# Patient Record
Sex: FEMALE | Race: WHITE | NOT HISPANIC OR LATINO | Employment: OTHER | URBAN - METROPOLITAN AREA
[De-identification: names, ages, dates, MRNs, and addresses within clinical notes are randomized per-mention and may not be internally consistent; named-entity substitution may affect disease eponyms.]

---

## 2017-04-06 DIAGNOSIS — Z12.31 ENCOUNTER FOR SCREENING MAMMOGRAM FOR MALIGNANT NEOPLASM OF BREAST: ICD-10-CM

## 2017-06-23 ENCOUNTER — ALLSCRIPTS OFFICE VISIT (OUTPATIENT)
Dept: OTHER | Facility: OTHER | Age: 52
End: 2017-06-23

## 2017-06-23 DIAGNOSIS — Z12.31 ENCOUNTER FOR SCREENING MAMMOGRAM FOR MALIGNANT NEOPLASM OF BREAST: ICD-10-CM

## 2017-11-09 ENCOUNTER — TRANSCRIBE ORDERS (OUTPATIENT)
Dept: ADMINISTRATIVE | Facility: HOSPITAL | Age: 52
End: 2017-11-09

## 2017-11-09 DIAGNOSIS — Z12.39 SCREENING BREAST EXAMINATION: Primary | ICD-10-CM

## 2017-11-14 ENCOUNTER — HOSPITAL ENCOUNTER (OUTPATIENT)
Dept: MAMMOGRAPHY | Facility: CLINIC | Age: 52
Discharge: HOME | End: 2017-11-14
Payer: COMMERCIAL

## 2017-11-14 DIAGNOSIS — Z12.31 ENCOUNTER FOR SCREENING MAMMOGRAM FOR MALIGNANT NEOPLASM OF BREAST: ICD-10-CM

## 2017-11-14 DIAGNOSIS — Z12.39 SCREENING BREAST EXAMINATION: ICD-10-CM

## 2017-11-14 PROCEDURE — 77063 BREAST TOMOSYNTHESIS BI: CPT

## 2017-11-14 PROCEDURE — G0202 SCR MAMMO BI INCL CAD: HCPCS

## 2018-01-14 VITALS
DIASTOLIC BLOOD PRESSURE: 78 MMHG | HEIGHT: 64 IN | SYSTOLIC BLOOD PRESSURE: 110 MMHG | WEIGHT: 138.5 LBS | BODY MASS INDEX: 23.64 KG/M2

## 2018-01-16 NOTE — MISCELLANEOUS
Message   Recorded as Task   Date: 06/13/2016 08:05 AM, Created By: Krishna Bashir   Task Name: Go to Note   Assigned To: Timo Hays   Regarding Patient: Dian Corbett, Status: Active   Comment:    Timo Hays - 13 Jun 2016 8:05 AM     TASK CREATED  Patient should benefit by having a automated breast ultrasound due to breast density and risk factors   Julieta King - 13 Jun 2016 9:33 AM     TASK REPLIED TO: 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

## 2018-01-18 NOTE — MISCELLANEOUS
Message   Date: 25 Jul 2016 9:36 AM EST, Recorded By: Chico Reddy For: Juliana Felix   Caller: Filiberto Pineda, Self   Phone: [phone] (Home)   Reason: Other   pt was supposed to have a sonohyst today, but her insurance was denying payments    pt will settle the issue with her insurance and will call back to reschedule the sonohyst per [facility] orders           Active Problems    1  Dense breasts (793 82) (R92 2)   2  Encounter for routine gynecological examination (V72 31) (Z01 419)   3  Encounter for routine gynecological examination with Papanicolaou smear of cervix   (V72 31,V76 2) (Z01 419)   4  Encounter for screening for malignant neoplasm of cervix (V76 2) (Z12 4)   5  History of self breast exam   6  Menorrhagia with irregular cycle (626 2) (N92 1)   7  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

## 2018-07-19 ENCOUNTER — ANNUAL EXAM (OUTPATIENT)
Dept: OBGYN CLINIC | Facility: CLINIC | Age: 53
End: 2018-07-19
Payer: COMMERCIAL

## 2018-07-19 VITALS
HEIGHT: 64 IN | SYSTOLIC BLOOD PRESSURE: 124 MMHG | BODY MASS INDEX: 24.17 KG/M2 | DIASTOLIC BLOOD PRESSURE: 78 MMHG | WEIGHT: 141.6 LBS

## 2018-07-19 DIAGNOSIS — Z01.419 WOMEN'S ANNUAL ROUTINE GYNECOLOGICAL EXAMINATION: Primary | ICD-10-CM

## 2018-07-19 DIAGNOSIS — Z12.31 VISIT FOR SCREENING MAMMOGRAM: ICD-10-CM

## 2018-07-19 PROCEDURE — 99396 PREV VISIT EST AGE 40-64: CPT | Performed by: OBSTETRICS & GYNECOLOGY

## 2018-07-19 RX ORDER — ATORVASTATIN CALCIUM 80 MG/1
TABLET, FILM COATED ORAL
COMMUNITY

## 2018-07-19 RX ORDER — LEVOTHYROXINE SODIUM 0.12 MG/1
TABLET ORAL
COMMUNITY
Start: 2015-03-19

## 2018-07-19 NOTE — PROGRESS NOTES
Assessment/Plan:  1  Yearly exam-Pap smear deferred, self-breast awareness reviewed, calcium/vitamin-D recommendations discussed, mammogram request given, colonoscopy as per specialist   2   History of dense breast tissue-most recent mammogram November 2017 was normal without increased density  Request for repeat 3D mammogram given November 2018  3   History of mucinous cystadenoma of the ovary-patient status post surgical removal   4   Prior history of simple hyperplasia without atypia-noted at the time of surgery October 2014  Patient has had no abnormal bleeding since that time  In fact, last menses September 2016  She will call with any postmenopausal bleeding or issues  She will follow-up in 1 year or as needed  No problem-specific Assessment & Plan notes found for this encounter  Diagnoses and all orders for this visit:    Women's annual routine gynecological examination    Visit for screening mammogram  -     Mammo screening bilateral w 3d & cad; Future    Other orders  -     levothyroxine (SYNTHROID) 125 mcg tablet; Take by mouth  -     atorvastatin (LIPITOR) 80 mg tablet; Take by mouth          Subjective:      Patient ID: Rach Martinez is a 46 y o  female  The patient was seen today for yearly exam   Please see assessment plan for details  The following portions of the patient's history were reviewed and updated as appropriate: allergies, current medications, past family history, past medical history, past social history, past surgical history and problem list     Review of Systems   Constitutional: Negative for chills, diaphoresis, fatigue and fever  Respiratory: Negative for apnea, cough, chest tightness, shortness of breath and wheezing  Cardiovascular: Negative for chest pain, palpitations and leg swelling  Gastrointestinal: Negative for abdominal distention, abdominal pain, anal bleeding, constipation, diarrhea, nausea, rectal pain and vomiting     Genitourinary: Negative for difficulty urinating, dyspareunia, dysuria, frequency, hematuria, menstrual problem, pelvic pain, urgency, vaginal bleeding, vaginal discharge and vaginal pain  Musculoskeletal: Negative for arthralgias, back pain and myalgias  Skin: Negative for color change and rash  Neurological: Negative for dizziness, syncope, light-headedness, numbness and headaches  Hematological: Negative for adenopathy  Does not bruise/bleed easily  Psychiatric/Behavioral: Negative for dysphoric mood and sleep disturbance  The patient is not nervous/anxious  Objective:      /78 (BP Location: Left arm, Patient Position: Sitting, Cuff Size: Adult)   Ht 5' 4" (1 626 m)   Wt 64 2 kg (141 lb 9 6 oz)   LMP  (LMP Unknown)   Breastfeeding? No   BMI 24 31 kg/m²          Physical Exam    Objective      /78 (BP Location: Left arm, Patient Position: Sitting, Cuff Size: Adult)   Ht 5' 4" (1 626 m)   Wt 64 2 kg (141 lb 9 6 oz)   LMP  (LMP Unknown)   Breastfeeding? No   BMI 24 31 kg/m²     General:   alert and oriented, in no acute distress, alert, appears stated age and cooperative   Neck: normal to inspection and palpation   Breast: normal appearance, no masses or tenderness   Heart:    Lungs:    Abdomen: soft, non-tender, without masses or organomegaly   Vulva: normal   Vagina: Without erythema or lesions or discharge  Normal   Cervix: Without lesions or discharge or cervicitis    No Cervical motion tenderness and normal   Uterus: top normal size, anteverted, non-tender   Adnexa: no mass, fullness, tenderness   Rectum: deferred, patient declines

## 2019-05-23 ENCOUNTER — TRANSCRIBE ORDERS (OUTPATIENT)
Dept: ADMINISTRATIVE | Facility: HOSPITAL | Age: 54
End: 2019-05-23

## 2019-06-03 ENCOUNTER — HOSPITAL ENCOUNTER (OUTPATIENT)
Dept: MAMMOGRAPHY | Facility: CLINIC | Age: 54
Discharge: HOME | End: 2019-06-03
Payer: COMMERCIAL

## 2019-06-03 VITALS — HEIGHT: 64 IN | BODY MASS INDEX: 24.07 KG/M2 | WEIGHT: 141 LBS

## 2019-06-03 DIAGNOSIS — Z12.31 VISIT FOR SCREENING MAMMOGRAM: ICD-10-CM

## 2019-06-03 PROCEDURE — 77063 BREAST TOMOSYNTHESIS BI: CPT

## 2019-06-03 PROCEDURE — 77067 SCR MAMMO BI INCL CAD: CPT

## 2021-03-11 DIAGNOSIS — Z12.31 ENCOUNTER FOR SCREENING MAMMOGRAM FOR BREAST CANCER: Primary | ICD-10-CM

## 2021-04-16 DIAGNOSIS — Z12.31 ENCOUNTER FOR SCREENING MAMMOGRAM FOR BREAST CANCER: ICD-10-CM

## 2021-04-28 ENCOUNTER — ANNUAL EXAM (OUTPATIENT)
Dept: OBGYN CLINIC | Facility: CLINIC | Age: 56
End: 2021-04-28
Payer: COMMERCIAL

## 2021-04-28 VITALS
DIASTOLIC BLOOD PRESSURE: 60 MMHG | HEIGHT: 64 IN | BODY MASS INDEX: 25.88 KG/M2 | SYSTOLIC BLOOD PRESSURE: 138 MMHG | WEIGHT: 151.6 LBS

## 2021-04-28 DIAGNOSIS — Z12.31 ENCOUNTER FOR SCREENING MAMMOGRAM FOR BREAST CANCER: ICD-10-CM

## 2021-04-28 DIAGNOSIS — Z11.51 SCREENING FOR HPV (HUMAN PAPILLOMAVIRUS): ICD-10-CM

## 2021-04-28 DIAGNOSIS — D22.9 SKIN MOLE: ICD-10-CM

## 2021-04-28 DIAGNOSIS — Z01.419 WOMEN'S ANNUAL ROUTINE GYNECOLOGICAL EXAMINATION: Primary | ICD-10-CM

## 2021-04-28 DIAGNOSIS — N95.2 VAGINAL ATROPHY: ICD-10-CM

## 2021-04-28 PROCEDURE — 99396 PREV VISIT EST AGE 40-64: CPT | Performed by: OBSTETRICS & GYNECOLOGY

## 2021-04-28 PROCEDURE — G0145 SCR C/V CYTO,THINLAYER,RESCR: HCPCS | Performed by: OBSTETRICS & GYNECOLOGY

## 2021-04-28 PROCEDURE — 87624 HPV HI-RISK TYP POOLED RSLT: CPT | Performed by: OBSTETRICS & GYNECOLOGY

## 2021-04-28 RX ORDER — ESCITALOPRAM OXALATE 10 MG/1
TABLET ORAL
COMMUNITY
Start: 2021-03-06

## 2021-04-28 NOTE — PATIENT INSTRUCTIONS
Vaginal Atrophy   WHAT YOU NEED TO KNOW:   What is vaginal atrophy? Vaginal atrophy is a condition that causes thinning, drying, and inflammation of vaginal tissue  This condition is caused by decreased levels of estrogen (a female sex hormone)  Vaginal atrophy can increase your risk for vaginal and urinary tract infections  Vaginal atrophy can worsen over time if not treated  What causes or increases your risk of vaginal atrophy? · Menopause     · Medicines that lower your estrogen levels, such as those used to treat breast cancer, endometriosis, or fibroids    · Radiation to your pelvic area     · Surgery to remove the ovaries    · Breastfeeding    What are the signs and symptoms of vaginal atrophy? · Vaginal dryness, itching, and burning    · Vaginal discharge    · Pain or discomfort during sex    · Light bleeding after sex    · Burning during urination    · Frequent, sudden, strong urges to urinate    · Urinary incontinence (loss of control of your bladder)    How is vaginal atrophy diagnosed? Your healthcare provider will ask about your symptoms  A pelvic exam will be done to examine your vagina and cervix  Your healthcare provider will place a speculum into your vagina to open and examine it  A sample of discharge from your vagina may be collected and tested  A urine test may also be done  How is vaginal atrophy treated? · Over-the counter vaginal moisturizers  can help reduce dryness  Your healthcare provider may recommend that you use a vaginal moisturizer several times each week and during sex  Only use creams that are made for vaginal use  Do  not  use petroleum jelly  Lubricants can be used during sex to decrease pain and discomfort  · Estrogen  may help decrease dryness  It may also lower your risk of vaginal infections if you are going through menopause  It can also help to relieve urinary symptoms  Estrogen may be prescribed in the form of a cream, tablet, or ring   These medicines can be applied or inserted into the vagina  Estrogen can also be prescribed in the form of a pill  When should I contact my healthcare provider? · You have a foul-smelling odor coming from your vagina  · You have a thick, cheese-like discharge from your vagina  · You have itching, swelling, or redness in your vagina  · You have pain or burning when you urinate  · Your urine smells bad  · Your symptoms do not improve, or they get worse  · You have questions or concerns about your condition or care  CARE AGREEMENT:   You have the right to help plan your care  Learn about your health condition and how it may be treated  Discuss treatment options with your healthcare providers to decide what care you want to receive  You always have the right to refuse treatment  The above information is an  only  It is not intended as medical advice for individual conditions or treatments  Talk to your doctor, nurse or pharmacist before following any medical regimen to see if it is safe and effective for you  © Copyright [address] Information is for End User's use only and may not be sold, redistributed or otherwise used for commercial purposes   All illustrations and images included in CareNotes® are the copyrighted property of A D A M , Inc  or [address] [place] [address] [place]

## 2021-04-28 NOTE — PROGRESS NOTES
Assessment/Plan   Diagnoses and all orders for this visit:    Women's annual routine gynecological examination    Encounter for screening mammogram for breast cancer  -     Mammo screening bilateral w 3d & cad; Future    Vaginal atrophy    Skin mole    Other orders  -     escitalopram (LEXAPRO) 10 mg tablet; TAKE 1 TABLET BY MOUTH EVERY DAY FOR ANXIETY    1   Yearly exam-Pap smear done with HPV testing, self-breast awareness reviewed, calcium/vitamin-D recommendations discussed, mammogram request given, colonoscopy as per specialist   2  History of dense breast changes-most recent 3D mammogram 4/14/21 normal with normal density  3  Intermittent pelvic cramping-she recently started Lexapro with weight gain and some cramping  Exam was notable for no focal findings  Recommended she consider follow-up colonoscopy given this along with rectal discomfort and she plans to proceed in this fashion  Also, suggested pelvic ultrasound to rule out any adnexal masses  She will consider this, and return in the next few weeks for ultrasound and office visit if she plans to proceed in this fashion  4  History of ovarian mucinous cystadenoma-removed in the past   Again, recommend pelvic ultrasound as noted above  5  Pigmented mole-noted midportion right labia majora  Well-circumscribed dark approximately 3-4 mm with normal borders and no color variation noted  Patient states that has been present for many years  Suggested removal and she will consider  6  Previous history of simple hyperplasia without atypia-noted with surgery October 2014 with endometrial polyp and the mucinous cystadenoma as noted above  She has had no further bleeding  To call with any postmenopausal bleeding  7  Rectal discomfort-noted above  Small hemorrhoid is appreciated  Recommend follow-up with GI   8  Small mole-noted approximately 5 cm above the rectum  It appears to be benign in appearance    Adjacent to this, patient had some type of probable spacious/inclusion cyst which has resolved  To follow-up if any symptoms  5  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[place], now works for [employer]  My congratulations were given  10  Vaginal atrophy-mild changes noted on exam   Patient does note intermittent discomfort with sex  Vaginal lubrication/moisturizer sheet given, to proceed accordingly  Briefly counseled about vaginal estrogen, to consider going forward as needed  Follow-up 1 year for yearly exam or as needed  Subjective   Patient ID: Phyllis Oconnell is a 63 y o  female  Vitals:    21 1208   BP: 138/60     Patient was seen today for yearly exam   Please see assessment plan for details        The following portions of the patient's history were reviewed and updated as appropriate: allergies, current medications, past family history, past medical history, past social history, past surgical history and problem list   Past Medical History:   Diagnosis Date    Disease of thyroid gland     Fibroid      Past Surgical History:   Procedure Laterality Date    BREAST BIOPSY Left     CORE BIOPSY     SECTION      OOPHORECTOMY Left     AGE 51     OB History    Para Term  AB Living   3 3 3     3   SAB TAB Ectopic Multiple Live Births                  # Outcome Date GA Lbr Jef/2nd Weight Sex Delivery Anes PTL Lv   3 Term            2 Term            1 Term                Current Outpatient Medications:     atorvastatin (LIPITOR) 80 mg tablet, Take by mouth, Disp: , Rfl:     escitalopram (LEXAPRO) 10 mg tablet, TAKE 1 TABLET BY MOUTH EVERY DAY FOR ANXIETY, Disp: , Rfl:     levothyroxine (SYNTHROID) 125 mcg tablet, Take by mouth, Disp: , Rfl:   No Known Allergies  Social History     Socioeconomic History    Marital status: /Civil Union     Spouse name: None    Number of children: None    Years of education: None    Highest education level: None   Occupational History    None   Social Needs    Financial resource strain: None    Food insecurity     Worry: None     Inability: None    Transportation needs     Medical: None     Non-medical: None   Tobacco Use    Smoking status: Never Smoker    Smokeless tobacco: Never Used   Substance and Sexual Activity    Alcohol use: Yes     Comment: occ    Drug use: No    Sexual activity: Yes     Partners: Male   Lifestyle    Physical activity     Days per week: None     Minutes per session: None    Stress: None   Relationships    Social connections     Talks on phone: None     Gets together: None     Attends Congregational service: None     Active member of club or organization: None     Attends meetings of clubs or organizations: None     Relationship status: None    Intimate partner violence     Fear of current or ex partner: None     Emotionally abused: None     Physically abused: None     Forced sexual activity: None   Other Topics Concern    None   Social History Narrative    None     Family History   Problem Relation Age of Onset    No Known Problems Mother     No Known Problems Father     No Known Problems Sister     No Known Problems Daughter     No Known Problems Sister     No Known Problems Sister     No Known Problems Sister     No Known Problems Sister     No Known Problems Daughter     No Known Problems Paternal Aunt     No Known Problems Paternal Aunt        Review of Systems   Constitutional: Negative for chills, diaphoresis, fatigue and fever  Respiratory: Negative for apnea, cough, chest tightness, shortness of breath and wheezing  Cardiovascular: Negative for chest pain, palpitations and leg swelling  Gastrointestinal: Negative for abdominal distention, abdominal pain, anal bleeding, constipation, diarrhea, nausea, rectal pain and vomiting     Genitourinary: Negative for difficulty urinating, dyspareunia, dysuria, frequency, hematuria, menstrual problem, pelvic pain, urgency, vaginal bleeding, vaginal discharge and vaginal pain  Musculoskeletal: Negative for arthralgias, back pain and myalgias  Skin: Negative for color change and rash  Neurological: Negative for dizziness, syncope, light-headedness, numbness and headaches  Hematological: Negative for adenopathy  Does not bruise/bleed easily  Psychiatric/Behavioral: Negative for dysphoric mood and sleep disturbance  The patient is not nervous/anxious  Objective   Physical Exam    Objective      /60 (BP Location: Left arm, Patient Position: Sitting, Cuff Size: Standard)   Ht 5' 4" (1 626 m)   Wt 68 8 kg (151 lb 9 6 oz)   LMP  (LMP Unknown)   BMI 26 02 kg/m²     General:   alert and oriented, in no acute distress   Neck: normal to inspection and palpation   Breast: normal appearance, no masses or tenderness   Heart:    Lungs:    Abdomen: soft, non-tender, without masses or organomegaly   Vulva: One focal area of dark discoloration midportion of the right labia majora  Otherwise, within normal limits   Vagina: Mildly atrophic, without erythema or lesions or discharge  Cervix: Mildly atrophic, without lesions or discharge or cervicitis  No Cervical motion tenderness   Uterus: top normal size, anteverted, non-tender   Adnexa: no mass, fullness, tenderness   Rectum: Negative, small external hemorrhoid noted at 1200 hours    Psych:  Normal mood and affect   Skin:  Without obvious lesions    Small flat mole noted approximately 5 cm above the rectum, without irregular borders or multiple coloration   Eyes: symmetric, with normal movements and reactivity   Musculoskeletal:  Normal muscle tone and movements appreciated

## 2021-04-30 LAB
HPV HR 12 DNA CVX QL NAA+PROBE: NEGATIVE
HPV16 DNA CVX QL NAA+PROBE: NEGATIVE
HPV18 DNA CVX QL NAA+PROBE: NEGATIVE

## 2021-05-05 LAB
LAB AP GYN PRIMARY INTERPRETATION: NORMAL
Lab: NORMAL

## 2022-05-11 ENCOUNTER — ANNUAL EXAM (OUTPATIENT)
Dept: OBGYN CLINIC | Facility: CLINIC | Age: 57
End: 2022-05-11
Payer: COMMERCIAL

## 2022-05-11 VITALS
HEIGHT: 65 IN | DIASTOLIC BLOOD PRESSURE: 82 MMHG | SYSTOLIC BLOOD PRESSURE: 122 MMHG | BODY MASS INDEX: 23.32 KG/M2 | WEIGHT: 140 LBS

## 2022-05-11 DIAGNOSIS — Z12.31 ENCOUNTER FOR SCREENING MAMMOGRAM FOR BREAST CANCER: ICD-10-CM

## 2022-05-11 DIAGNOSIS — N95.2 VAGINAL ATROPHY: ICD-10-CM

## 2022-05-11 DIAGNOSIS — Z01.419 WOMEN'S ANNUAL ROUTINE GYNECOLOGICAL EXAMINATION: Primary | ICD-10-CM

## 2022-05-11 DIAGNOSIS — D22.9 SKIN MOLE: ICD-10-CM

## 2022-05-11 PROCEDURE — 99396 PREV VISIT EST AGE 40-64: CPT | Performed by: OBSTETRICS & GYNECOLOGY

## 2022-05-11 RX ORDER — LEVOTHYROXINE SODIUM 112 UG/1
TABLET ORAL
COMMUNITY
Start: 2022-03-04

## 2022-05-11 RX ORDER — ROSUVASTATIN CALCIUM 20 MG/1
20 TABLET, COATED ORAL DAILY
COMMUNITY
Start: 2022-03-09

## 2022-05-11 NOTE — PROGRESS NOTES
Assessment/Plan   Diagnoses and all orders for this visit:    Women's annual routine gynecological examination    Encounter for screening mammogram for breast cancer  -     Mammo screening bilateral w 3d & cad; Future    Vaginal atrophy    Other orders  -     levothyroxine 112 mcg tablet; TAKE 1 TABLET BY MOUTH EVERY DAY MON -FRID  AND 1/2 SATURDAY SUNDAY FOR HYPOTHYROID  -     rosuvastatin (CRESTOR) 20 MG tablet; Take 20 mg by mouth in the morning  (Patient not taking: Reported on 5/11/2022)    1  Yearly exam-Pap smear deferred, self-breast awareness reviewed, calcium/vitamin-D recommendations discussed, mammogram request given, colonoscopy as per specialist   2  Previous dense breast changes-most recent mammogram April 2021 with normal density noted  Request was given to get mammogram done, she will get this done in the near future  3  Intermittent pelvic cramping-denies any current concerns  She did have pelvic ultrasound done last year at [facility] which was reportedly negative  I am unable to visualize that report  Record release given, to obtain ultrasound findings from [facility]   4  History of ovarian mucinous cystadenoma-left ovary removed previously  5  Pigmented mole-stable finding noted midportion right labia majora  Patient states that has been present for many many years  Again, discussed removal   She will defer at this time  To call or return with any changes  A/B CD reviewed  6  Prior history of simple hyperplasia without atypia-noted at [facility]  surgery October 2014 with endometrial polyp and left ovary removal with mucinous cystadenoma  She has had no further bleeding  To call or return with any issues  7  History of rectal discomfort-no current concerns  8  History of super rectal mole-none noted on exam today  Previous visit was consistent with likely sebaceous/inclusion cyst which has resolved    9  Vaginal atrophy-mild changes noted on exam   Vaginal lubrication/moisturizer sheet given, to use accordingly  Briefly discussed vaginal estrogen, she will consider as needed  10  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[place], now works for [employer]  My congratulations given  Follow-up 1 year for yearly exam as needed  Subjective   Patient ID: Sharyle Adas is a 61 y o  female  Vitals:    22 0812   BP: 122/82     Patient was seen today for yearly exam   Please see assessment plan for details  The following portions of the patient's history were reviewed and updated as appropriate: allergies, current medications, past family history, past medical history, past social history, past surgical history and problem list   Past Medical History:   Diagnosis Date    Disease of thyroid gland     Fibroid      Past Surgical History:   Procedure Laterality Date    BREAST BIOPSY Left 2009    CORE BIOPSY     SECTION      OOPHORECTOMY Left     AGE 51     OB History    Para Term  AB Living   3 3 3     3   SAB IAB Ectopic Multiple Live Births                  # Outcome Date GA Lbr Jef/2nd Weight Sex Delivery Anes PTL Lv   3 Term            2 Term            1 Term                Current Outpatient Medications:     atorvastatin (LIPITOR) 80 mg tablet, Take by mouth, Disp: , Rfl:     levothyroxine 112 mcg tablet, TAKE 1 TABLET BY MOUTH EVERY DAY MON -  AND /2  FOR HYPOTHYROID, Disp: , Rfl:     escitalopram (LEXAPRO) 10 mg tablet, TAKE 1 TABLET BY MOUTH EVERY DAY FOR ANXIETY (Patient not taking: Reported on 2022), Disp: , Rfl:     levothyroxine (SYNTHROID) 125 mcg tablet, Take by mouth, Disp: , Rfl:     rosuvastatin (CRESTOR) 20 MG tablet, Take 20 mg by mouth in the morning   (Patient not taking: Reported on 2022), Disp: , Rfl:   No Known Allergies  Social History     Socioeconomic History    Marital status: /Civil Union     Spouse name: None    Number of children: None    Years of education: None    Highest education level: None   Occupational History    None   Tobacco Use    Smoking status: Never Smoker    Smokeless tobacco: Never Used   Vaping Use    Vaping Use: Never used   Substance and Sexual Activity    Alcohol use: Yes     Comment: occ    Drug use: No    Sexual activity: Yes     Partners: Male   Other Topics Concern    None   Social History Narrative    None     Social Determinants of Health     Financial Resource Strain: Not on file   Food Insecurity: Not on file   Transportation Needs: Not on file   Physical Activity: Not on file   Stress: Not on file   Social Connections: Not on file   Intimate Partner Violence: Not on file   Housing Stability: Not on file     Family History   Problem Relation Age of Onset    No Known Problems Mother     No Known Problems Father     No Known Problems Sister     No Known Problems Daughter     No Known Problems Sister     No Known Problems Sister     No Known Problems Sister     No Known Problems Sister     No Known Problems Daughter     No Known Problems Paternal Aunt     No Known Problems Paternal Aunt        Review of Systems   Constitutional: Negative for chills, diaphoresis, fatigue and fever  Respiratory: Negative for apnea, cough, chest tightness, shortness of breath and wheezing  Cardiovascular: Negative for chest pain, palpitations and leg swelling  Gastrointestinal: Negative for abdominal distention, abdominal pain, anal bleeding, constipation, diarrhea, nausea, rectal pain and vomiting  Genitourinary: Negative for difficulty urinating, dyspareunia, dysuria, frequency, hematuria, menstrual problem, pelvic pain, urgency, vaginal bleeding, vaginal discharge and vaginal pain  Musculoskeletal: Negative for arthralgias, back pain and myalgias  Skin: Negative for color change and rash  Neurological: Negative for dizziness, syncope, light-headedness, numbness and headaches  Hematological: Negative for adenopathy   Does not bruise/bleed easily  Psychiatric/Behavioral: Negative for dysphoric mood and sleep disturbance  The patient is not nervous/anxious  Objective   Physical Exam  OBGyn Exam     Objective      /82 (BP Location: Left arm, Patient Position: Sitting, Cuff Size: Adult)   Ht 5' 5" (1 651 m)   Wt 63 5 kg (140 lb)   LMP  (LMP Unknown)   BMI 23 30 kg/m²     General:   alert and oriented, in no acute distress   Neck: normal to inspection and palpation   Breast: normal appearance, no masses or tenderness   Heart:    Lungs:    Abdomen: soft, non-tender, without masses or organomegaly   Vulva: One focal area of dark discoloration in the midportion of the right labia majora, less than 0 5 cm diameter  No multi color or irregular borders appreciated  No raised or firm areas noted   Vagina: Mildly atrophic, without erythema or lesions or discharge  Cervix: Mildly atrophic, no lesions or discharge or cervicitis    No Cervical motion tenderness   Uterus: top normal size, anteverted, non-tender   Adnexa: no mass, fullness, tenderness   Rectum: Deferred, patient declined    Psych:  Normal mood and affect   Skin:  Without obvious lesions   Eyes: symmetric, with normal movements and reactivity   Musculoskeletal:  Normal muscle tone and movements appreciated

## 2022-05-16 NOTE — PROGRESS NOTES
Patient seen 5/11/22  Signed record release at that time to obtain results of pelvic ultrasound from [facility]   Please obtain this report  Thanks

## 2022-06-01 ENCOUNTER — TELEPHONE (OUTPATIENT)
Dept: OBGYN CLINIC | Facility: CLINIC | Age: 57
End: 2022-06-01

## 2022-06-01 NOTE — TELEPHONE ENCOUNTER
Patient was seen on 5/11/22  At that time, she had a history of pelvic cramping in the past, but not at that recent visit  She did state that she had previous ultrasound at [facility]   We did receive a copy of reports, with abdominal ultrasound done with no gallstones or biliary dilation and unremarkable liver echotexture  Additionally, thyroid ultrasound was with lymph nodes only  No pelvic ultrasound report was received  If patient has any further pelvic cramping, would suggest we proceed with pelvic ultrasound as needed  This could be done in [facility] or [facility] with follow-up office was with me    If asymptomatic, would recommend no intervention at this time and she could follow-up May 2023 for yearly exam

## 2022-06-22 NOTE — PROGRESS NOTES
Please contact the patient  We did receive her records from [facility], only saw that a thyroid ultrasound and abdominal ultrasound were done last year  No pelvic ultrasound was done  If patient is still continue with cramping, would recommend she return for ultrasound and office visit with me    Thanks, Adri Lambert MD

## 2022-06-28 NOTE — PROGRESS NOTES
LM for patient with detailed message to call us if she is still having cramping to return for ultrasound and office visit with Dr Shashank Rendon

## 2023-05-17 ENCOUNTER — ANNUAL EXAM (OUTPATIENT)
Dept: GYNECOLOGY | Facility: CLINIC | Age: 58
End: 2023-05-17

## 2023-05-17 VITALS
WEIGHT: 138.8 LBS | DIASTOLIC BLOOD PRESSURE: 70 MMHG | SYSTOLIC BLOOD PRESSURE: 116 MMHG | HEIGHT: 64 IN | BODY MASS INDEX: 23.7 KG/M2

## 2023-05-17 DIAGNOSIS — Z12.31 ENCOUNTER FOR SCREENING MAMMOGRAM FOR BREAST CANCER: ICD-10-CM

## 2023-05-17 DIAGNOSIS — N95.2 VAGINAL ATROPHY: ICD-10-CM

## 2023-05-17 DIAGNOSIS — Z01.419 WOMEN'S ANNUAL ROUTINE GYNECOLOGICAL EXAMINATION: Primary | ICD-10-CM

## 2023-05-17 NOTE — PROGRESS NOTES
Assessment/Plan   Diagnoses and all orders for this visit:    Women's annual routine gynecological examination    Encounter for screening mammogram for breast cancer  -     Mammo screening bilateral w 3d & cad; Future    Vaginal atrophy    1  yearly exam-Pap smear deferred, self breast awareness reviewed, calcium/vitamin D recommendations discussed, mammogram request given, colonoscopy recommended  Had Cologuard February 2023 was negative by report  Recommend follow-up in 3 years time  2  previous dense breast changes-most recent mammogram from 4/16/2021 was normal with normal density  Request for mammogram given, she will get this done in the near future  3   Previous history of rectal discomfort/pelvic cramping-denies any complaints  Did have ultrasound done last year at [facility], but this was abdominal ultrasound and thyroid ultrasound which were unremarkable  Patient denies any symptoms, has changed her diet and is eating healthier with no symptoms  She will call or return with any issues  Pelvic exam is normal   4  prior history of mucinous ovarian cystadenoma-left ovary removed recently  5  history of pigmented mole/supra rectal mole- denies any current complaints  Exam is without suspicious findings  6  prior history of simple hyperplasia without atypia- noted at [facility]  from October 2014 with endometrial polyp noted and left ovary removal with mucinous cystadenoma  She has had no further bleeding  She will call return with any issues  7   Vaginal atrophy-mild changes noted on exam   She denies any complaints  Vaginal lubrication/moisturizer sheet given, to use accordingly  8   Other- 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[place], now works at [employer]  Follow-up 1 year for yearly exam or as needed  Subjective   Patient ID: Jose Sharma is a 61 y o  female      Vitals:    05/17/23 0810   BP: 116/70     Patient was seen today for yearly exam   Please see assessment plan for details  The following portions of the patient's history were reviewed and updated as appropriate: allergies, current medications, past family history, past medical history, past social history, past surgical history and problem list   Past Medical History:   Diagnosis Date   • Disease of thyroid gland    • Fibroid      Past Surgical History:   Procedure Laterality Date   • BREAST BIOPSY Left     CORE BIOPSY   •  SECTION     • OOPHORECTOMY Left     AGE 51     OB History    Para Term  AB Living   3 3 3     3   SAB IAB Ectopic Multiple Live Births                  # Outcome Date GA Lbr Jef/2nd Weight Sex Delivery Anes PTL Lv   3 Term            2 Term            1 Term                Current Outpatient Medications:   •  atorvastatin (LIPITOR) 80 mg tablet, Take by mouth, Disp: , Rfl:   •  levothyroxine 112 mcg tablet, TAKE 1 TABLET BY MOUTH EVERY DAY MON -  AND  FOR HYPOTHYROID, Disp: , Rfl:   •  escitalopram (LEXAPRO) 10 mg tablet, TAKE 1 TABLET BY MOUTH EVERY DAY FOR ANXIETY (Patient not taking: Reported on 2022), Disp: , Rfl:   •  levothyroxine 125 mcg tablet, Take by mouth (Patient not taking: Reported on 2023), Disp: , Rfl:   •  rosuvastatin (CRESTOR) 20 MG tablet, Take 20 mg by mouth in the morning   (Patient not taking: Reported on 2022), Disp: , Rfl:   No Known Allergies  Social History     Socioeconomic History   • Marital status: /Civil Union     Spouse name: None   • Number of children: None   • Years of education: None   • Highest education level: None   Occupational History   • None   Tobacco Use   • Smoking status: Never   • Smokeless tobacco: Never   Vaping Use   • Vaping Use: Never used   Substance and Sexual Activity   • Alcohol use: Yes     Comment: occ   • Drug use: No   • Sexual activity: Yes     Partners: Male   Other Topics Concern   • None   Social History Narrative   • None     Social Determinants of Health     Financial "Resource Strain: Not on file   Food Insecurity: Not on file   Transportation Needs: Not on file   Physical Activity: Not on file   Stress: Not on file   Social Connections: Not on file   Intimate Partner Violence: Not on file   Housing Stability: Not on file     Family History   Problem Relation Age of Onset   • No Known Problems Mother    • No Known Problems Father    • No Known Problems Sister    • No Known Problems Daughter    • No Known Problems Sister    • No Known Problems Sister    • No Known Problems Sister    • No Known Problems Sister    • No Known Problems Daughter    • No Known Problems Paternal Aunt    • No Known Problems Paternal Aunt        Review of Systems   Constitutional: Negative for chills, diaphoresis, fatigue and fever  Respiratory: Negative for apnea, cough, chest tightness, shortness of breath and wheezing  Cardiovascular: Negative for chest pain, palpitations and leg swelling  Gastrointestinal: Negative for abdominal distention, abdominal pain, anal bleeding, constipation, diarrhea, nausea, rectal pain and vomiting  Genitourinary: Negative for difficulty urinating, dyspareunia, dysuria, frequency, hematuria, menstrual problem, pelvic pain, urgency, vaginal bleeding, vaginal discharge and vaginal pain  Musculoskeletal: Negative for arthralgias, back pain and myalgias  Skin: Negative for color change and rash  Neurological: Negative for dizziness, syncope, light-headedness, numbness and headaches  Hematological: Negative for adenopathy  Does not bruise/bleed easily  Psychiatric/Behavioral: Negative for dysphoric mood and sleep disturbance  The patient is not nervous/anxious          Objective   Physical Exam  OBGyn Exam     Objective      /70 (BP Location: Right arm, Patient Position: Sitting)   Ht 5' 4\" (1 626 m)   Wt 63 kg (138 lb 12 8 oz)   LMP  (LMP Unknown)   BMI 23 82 kg/m²     General:   alert and oriented, in no acute distress   Neck: normal to inspection " and palpation   Breast: normal appearance, no masses or tenderness   Heart:    Lungs:    Abdomen: soft, non-tender, without masses or organomegaly   Vulva: normal   Vagina:  Mildly atrophic, without erythema or lesions or discharge  Cervix:  Mildly atrophic, cervix flush to the vagina, without lesions or discharge or cervicitis  No CMT  Uterus: top normal size, anteverted, non-tender   Adnexa: no mass, fullness, tenderness   Rectum:  Declined by patient      Psych:  Normal mood and affect   Skin:  Without obvious lesions   Eyes: symmetric, with normal movements and reactivity   Musculoskeletal:  Normal muscle tone and movements appreciated

## 2023-08-29 DIAGNOSIS — Z12.31 ENCOUNTER FOR SCREENING MAMMOGRAM FOR BREAST CANCER: ICD-10-CM

## 2024-03-13 ENCOUNTER — TELEPHONE (OUTPATIENT)
Dept: GYNECOLOGY | Facility: CLINIC | Age: 59
End: 2024-03-13

## 2024-03-13 NOTE — TELEPHONE ENCOUNTER
Called and left a message stating apt for  has been canceled on 5/22/24 due to provider not being in office. Sent a letter via mail to patient as well.

## 2024-09-11 ENCOUNTER — ANNUAL EXAM (OUTPATIENT)
Dept: GYNECOLOGY | Facility: CLINIC | Age: 59
End: 2024-09-11
Payer: COMMERCIAL

## 2024-09-11 VITALS
SYSTOLIC BLOOD PRESSURE: 116 MMHG | HEIGHT: 64 IN | BODY MASS INDEX: 24.41 KG/M2 | DIASTOLIC BLOOD PRESSURE: 64 MMHG | WEIGHT: 143 LBS

## 2024-09-11 DIAGNOSIS — Z12.31 ENCOUNTER FOR SCREENING MAMMOGRAM FOR BREAST CANCER: ICD-10-CM

## 2024-09-11 DIAGNOSIS — Z01.419 WOMEN'S ANNUAL ROUTINE GYNECOLOGICAL EXAMINATION: Primary | ICD-10-CM

## 2024-09-11 DIAGNOSIS — Z11.51 SCREENING FOR HUMAN PAPILLOMAVIRUS (HPV): ICD-10-CM

## 2024-09-11 DIAGNOSIS — N95.2 VAGINAL ATROPHY: ICD-10-CM

## 2024-09-11 PROCEDURE — 99396 PREV VISIT EST AGE 40-64: CPT | Performed by: OBSTETRICS & GYNECOLOGY

## 2024-09-11 NOTE — PROGRESS NOTES
Assessment & Plan   Diagnoses and all orders for this visit:    Women's annual routine gynecological examination    Encounter for screening mammogram for breast cancer  -     Mammo screening bilateral w 3d and cad; Future    Vaginal atrophy    1. yearly exam-Pap smear done with HPV testing, self breast awareness reviewed, calcium/vitamin D recommendations discussed, mammogram request given, colonoscopy done June 2024 in [place] with benign results and 10-year follow-up recommended.  Cologuard was previously - 12/16/2022.  2. previous dense breast changes-most recent mammogram was normal from 8/14/2023.  Request was given.  3.  Previous history of pelvic cramping/rectal discomfort-denies complaints.  4.  Prior history of mucinous ovarian cystadenoma-status post left ovary removal October 2014  5.  History of pigmented mole/supra rectal mole-no current concerns exam   6.  History of simple hyperplasia without atypia-Noted at [facility] from October 2014 with endometrial polyp found along with left ovary with mucinous cystadenoma.  7.  Vaginal atrophy-mild changes noted on exam.  She does use lubrication and moisturizer with good result.  Briefly counseled about vaginal estrogen, she will defer but consider going forward as needed.  8. concerned about vulvar swelling-no significant findings were noted on exam.  Faint varicose veins were noted bilaterally, could be causing this symptom.  No vulvitis or vaginitis was appreciated.  Did discuss V2 supporter and she will consider this.  9. 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  My support was given.  Follow-up 1 year for yearly exam or as needed.    Subjective   Patient ID: Riddhi Castañeda is a 59 y.o. female.    There were no vitals filed for this visit.  Patient was seen today for yearly exam.  Please see assessment plan for details.        The following portions of the patient's history were reviewed and updated as appropriate:  allergies, current medications, past family history, past medical history, past social history, past surgical history, and problem list.  Past Medical History:   Diagnosis Date    Disease of thyroid gland     Fibroid      Past Surgical History:   Procedure Laterality Date    BREAST BIOPSY Left 2009    CORE BIOPSY     SECTION      OOPHORECTOMY Left     AGE 48     OB History    Para Term  AB Living   3 3 3     3   SAB IAB Ectopic Multiple Live Births                  # Outcome Date GA Lbr Jef/2nd Weight Sex Type Anes PTL Lv   3 Term            2 Term            1 Term                Current Outpatient Medications:     atorvastatin (LIPITOR) 80 mg tablet, Take by mouth, Disp: , Rfl:     escitalopram (LEXAPRO) 10 mg tablet, TAKE 1 TABLET BY MOUTH EVERY DAY FOR ANXIETY (Patient not taking: Reported on 2022), Disp: , Rfl:     levothyroxine 112 mcg tablet, TAKE 1 TABLET BY MOUTH EVERY DAY MON.-FRID. AND  FOR HYPOTHYROID, Disp: , Rfl:     levothyroxine 125 mcg tablet, Take by mouth (Patient not taking: Reported on 2023), Disp: , Rfl:     rosuvastatin (CRESTOR) 20 MG tablet, Take 20 mg by mouth in the morning. (Patient not taking: Reported on 2022), Disp: , Rfl:   No Known Allergies  Social History     Socioeconomic History    Marital status: /Civil Union     Spouse name: Not on file    Number of children: Not on file    Years of education: Not on file    Highest education level: Not on file   Occupational History    Not on file   Tobacco Use    Smoking status: Never    Smokeless tobacco: Never   Vaping Use    Vaping status: Never Used   Substance and Sexual Activity    Alcohol use: Yes     Comment: occ    Drug use: No    Sexual activity: Yes     Partners: Male   Other Topics Concern    Not on file   Social History Narrative    Not on file     Social Determinants of Health     Financial Resource Strain: Not on file   Food Insecurity: Not on file   Transportation  Needs: Not on file   Physical Activity: Not on file   Stress: Not on file   Social Connections: Not on file   Intimate Partner Violence: Not on file   Housing Stability: Not on file     Family History   Problem Relation Age of Onset    No Known Problems Mother     No Known Problems Father     No Known Problems Sister     No Known Problems Daughter     No Known Problems Sister     No Known Problems Sister     No Known Problems Sister     No Known Problems Sister     No Known Problems Daughter     No Known Problems Paternal Aunt     No Known Problems Paternal Aunt        Review of Systems   Constitutional:  Negative for chills, diaphoresis, fatigue and fever.   Respiratory:  Negative for apnea, cough, chest tightness, shortness of breath and wheezing.    Cardiovascular:  Negative for chest pain, palpitations and leg swelling.   Gastrointestinal:  Negative for abdominal distention, abdominal pain, anal bleeding, constipation, diarrhea, nausea, rectal pain and vomiting.   Genitourinary:  Negative for difficulty urinating, dyspareunia, dysuria, frequency, hematuria, menstrual problem, pelvic pain, urgency, vaginal bleeding, vaginal discharge and vaginal pain.   Musculoskeletal:  Negative for arthralgias, back pain and myalgias.   Skin:  Negative for color change and rash.   Neurological:  Negative for dizziness, syncope, light-headedness, numbness and headaches.   Hematological:  Negative for adenopathy. Does not bruise/bleed easily.   Psychiatric/Behavioral:  Negative for dysphoric mood and sleep disturbance. The patient is not nervous/anxious.        Objective   Physical Exam  OBGyn Exam     Objective      LMP  (LMP Unknown)     General:   alert and oriented, in no acute distress   Neck: normal to inspection and palpation   Breast: normal appearance, no masses or tenderness   Heart:    Lungs:    Abdomen: soft, non-tender, without masses or organomegaly   Vulva: normal   Vagina: Without erythema or lesions or discharge.   Normal   Cervix: Without lesions or discharge or cervicitis.  No Cervical motion tenderness   Uterus: top normal size, anteverted, non-tender   Adnexa: no mass, fullness, tenderness   Rectum: negative    Psych:  Normal mood and affect   Skin:  Without obvious lesions   Eyes: symmetric, with normal movements and reactivity   Musculoskeletal:  Normal muscle tone and movements appreciated

## 2024-09-17 LAB
CYTOLOGIST CVX/VAG CYTO: NORMAL
DX ICD CODE: NORMAL
HPV GENOTYPE REFLEX: NORMAL
HPV I/H RISK 4 DNA CVX QL PROBE+SIG AMP: NEGATIVE
Lab: NORMAL
OTHER STN SPEC: NORMAL
PATH REPORT.FINAL DX SPEC: NORMAL
SL AMB NOTE:: NORMAL
SL AMB SPECIMEN ADEQUACY: NORMAL
SL AMB TEST METHODOLOGY: NORMAL

## 2025-04-23 ENCOUNTER — RESULTS FOLLOW-UP (OUTPATIENT)
Dept: GYNECOLOGY | Facility: CLINIC | Age: 60
End: 2025-04-23

## 2025-04-23 DIAGNOSIS — Z12.31 ENCOUNTER FOR SCREENING MAMMOGRAM FOR BREAST CANCER: ICD-10-CM
